# Patient Record
Sex: FEMALE | Race: WHITE | NOT HISPANIC OR LATINO | URBAN - METROPOLITAN AREA
[De-identification: names, ages, dates, MRNs, and addresses within clinical notes are randomized per-mention and may not be internally consistent; named-entity substitution may affect disease eponyms.]

---

## 2019-02-07 ENCOUNTER — HOSPITAL ENCOUNTER (OUTPATIENT)
Dept: OTHER | Facility: HOSPITAL | Age: 34
Setting detail: SPECIMEN
Discharge: HOME | End: 2019-02-07
Attending: SURGERY | Admitting: SURGERY

## 2023-05-22 ENCOUNTER — HOSPITAL ENCOUNTER (EMERGENCY)
Facility: HOSPITAL | Age: 38
Discharge: HOME | End: 2023-05-22
Attending: EMERGENCY MEDICINE | Admitting: EMERGENCY MEDICINE
Payer: MEDICAID

## 2023-05-22 VITALS
TEMPERATURE: 98.8 F | DIASTOLIC BLOOD PRESSURE: 100 MMHG | BODY MASS INDEX: 32.24 KG/M2 | HEIGHT: 68 IN | WEIGHT: 212.74 LBS | OXYGEN SATURATION: 100 % | SYSTOLIC BLOOD PRESSURE: 149 MMHG | HEART RATE: 93 BPM | RESPIRATION RATE: 16 BRPM

## 2023-05-22 DIAGNOSIS — S01.81XA FACIAL LACERATION, INITIAL ENCOUNTER: Primary | ICD-10-CM

## 2023-05-22 PROCEDURE — 0 LIDOCAINE 1 % SOLUTION

## 2023-05-22 PROCEDURE — 99282 EMERGENCY DEPT VISIT SF MDM: CPT

## 2023-05-22 RX ORDER — LIDOCAINE HYDROCHLORIDE 10 MG/ML
10 INJECTION, SOLUTION INFILTRATION; PERINEURAL ONCE
Status: COMPLETED | OUTPATIENT
Start: 2023-05-22 | End: 2023-05-22

## 2023-05-22 RX ORDER — DIAPER,BRIEF,INFANT-TODD,DISP
1 EACH MISCELLANEOUS ONCE
Status: COMPLETED | OUTPATIENT
Start: 2023-05-22 | End: 2023-05-22

## 2023-05-22 RX ADMIN — LIDOCAINE HYDROCHLORIDE 10 ML: 10 INJECTION, SOLUTION INFILTRATION; PERINEURAL at 21:42

## 2023-05-22 RX ADMIN — BACITRACIN 0.9 G: 500 OINTMENT TOPICAL at 22:44

## 2023-05-23 NOTE — ED PROVIDER NOTES
Subjective      Provider in Triage Note  Patient is a 38-year-old female presents to the ER today by private vehicle states that she was attempting to put a mirror up in her garage when it fell striking her in the head she did not lose consciousness she has no visual changes, no headache but is having pain at the site..  She believes she is up-to-date on her tetanus shot.  Bleeding controlled at triage.    History of Present Illness  Chief Complaint: Laceration      HPI: See PIT note    PCP: Gael        Review of Systems   Skin: Positive for wound.       History reviewed. No pertinent past medical history.    No Known Allergies    History reviewed. No pertinent surgical history.    History reviewed. No pertinent family history.    Social History     Socioeconomic History   • Marital status:            Objective   Physical Exam  HENT:      Head: Normocephalic. No raccoon eyes or Ye's sign.      Jaw: There is normal jaw occlusion.        Ears:      Comments: No bleeding or drainage from the ears nose     Nose: Nose normal.      Mouth/Throat:      Mouth: Mucous membranes are moist.      Pharynx: Oropharynx is clear.   Eyes:      Extraocular Movements: Extraocular movements intact.      Pupils: Pupils are equal, round, and reactive to light.   Cardiovascular:      Rate and Rhythm: Normal rate.      Pulses: Normal pulses.   Pulmonary:      Effort: Pulmonary effort is normal.   Musculoskeletal:         General: Normal range of motion.      Cervical back: Neck supple. No rigidity.   Skin:     General: Skin is warm and dry.   Neurological:      General: No focal deficit present.      Mental Status: She is alert and oriented to person, place, and time. Mental status is at baseline.         Laceration Repair    Date/Time: 5/22/2023 10:23 PM  Performed by: Isaura Aguilar APRN  Authorized by: Fabricio Treviño MD     Consent:     Consent obtained:  Verbal    Consent given by:  Patient    Risks discussed:   "Infection and poor cosmetic result    Alternatives discussed:  No treatment  Universal protocol:     Site/side marked: yes      Patient identity confirmed:  Verbally with patient, hospital-assigned identification number and arm band  Anesthesia:     Anesthesia method:  Local infiltration    Local anesthetic:  Lidocaine 1% w/o epi  Laceration details:     Location:  Face    Face location:  Forehead    Wound length (cm): approximately 2cm.  Pre-procedure details:     Preparation:  Patient was prepped and draped in usual sterile fashion  Exploration:     Hemostasis achieved with:  Direct pressure    Imaging outcome: foreign body not noted      Wound exploration: entire depth of wound visualized    Treatment:     Area cleansed with:  Shur-Clens and saline    Amount of cleaning:  Standard    Irrigation solution:  Sterile saline    Irrigation method:  Syringe    Debridement:  None    Undermining:  None  Skin repair:     Repair method:  Sutures    Suture size:  6-0    Suture material:  Nylon    Suture technique:  Simple interrupted    Number of sutures: 6.  Approximation:     Approximation:  Close  Repair type:     Repair type:  Simple  Post-procedure details:     Dressing:  Antibiotic ointment and adhesive bandage    Procedure completion:  Tolerated               ED Course  ED Course as of 05/22/23 2318   Mon May 22, 2023   2142 Lidocaine applied   [BH]      ED Course User Index  [BH] Isaura Aguilar, GAGANDEEP      /100   Pulse 93   Temp 98.8 °F (37.1 °C) (Temporal)   Resp 16   Ht 172.7 cm (68\")   Wt 96.5 kg (212 lb 11.9 oz)   SpO2 100%   BMI 32.35 kg/m²   Labs Reviewed - No data to display  Medications   lidocaine (XYLOCAINE) 1 % injection 10 mL (10 mL Injection Given 5/22/23 2142)   bacitracin ointment 0.9 g (0.9 g Topical Given 5/22/23 2244)     No radiology results for the last day                                       Medical Decision Making  This patient presented to the emergency room following being " struck in the head by a mirror she had no loss of consciousness she has no residual headache, no nausea or vomiting no double vision or blurred vision.  She did sustain a laceration to the left side of her forehead, bleeding controlled.  She believes she is up-to-date on her tetanus shot.  She states that the mirror did not break and has no concerns for embedded foreign body.  Patient was draped in sterile fashion, lidocaine used for anesthetic and area was cleaned with Shur-Clens and normal saline.  6 sutures applied, patient tolerated we discussed appropriate wound care and when to return to the emergency room as well as suture removal.  Bacitracin and dressing applied.  Patient gave verbal understanding of plan of care, she was alert oriented nontoxic in appearance time discharge, denied further questions or complaints.    Chart review: 4/27/2023 outpatient follow-up with Dr. Mayo      This document is intended for medical expert use only. Reading of this document by patients and/or patient's family without participating medical staff guidance may result in misinterpretation and unintended morbidity. Any interpretation of such data is the responsibility of the patient and/or family member responsible for the patient in concert with their primary or specialist providers, not to be left for sources of online searches such as [employer], [employer] or similar queries. Relying on these approaches to knowledge may result in misinterpretation, misguided goals of care and even death should patients or family members try recommendations outside of the realm of professional medical care in a supervised inpatient environment.     Note: Please forgive punctuation, typographic/voice recognition errors, as portions of this document were created with Dragon Dictation, a voice recognition software.    Appropriate PPE worn during exam.    Facial laceration, initial encounter: complicated acute illness or injury  Amount and/or  Complexity of Data Reviewed  External Data Reviewed: notes.      Risk  OTC drugs.  Prescription drug management.  Minor surgery with no identified risk factors.          Final diagnoses:   Facial laceration, initial encounter       ED Disposition  ED Disposition     ED Disposition   Discharge    Condition   Stable    Comment   --             PATIENT CONNECTION - [facility] [id number]  [phone]  Schedule an appointment as soon as possible for a visit in 1 week  As needed, If symptoms worsen         Medication List      No changes were made to your prescriptions during this visit.          Isaura Aguilar, APRN  05/22/23 3278

## 2023-05-23 NOTE — DISCHARGE INSTRUCTIONS
Keep area clean and dry can apply bacitracin or Neosporin.  Soap and water, daily 2-3 times a day  Ice to the area of discomfort 20 minutes at a time several times a day    Sutures can be removed in 7 to 10 days    Follow-up with primary care    Return to the ER for new or worsening symptoms

## 2023-05-23 NOTE — ED NOTES
Patient reports a mirror in her garage fell on her and the corner cut her forehead around 1730 tonight. Patient reports headache as well. Laceration noted above left eyebrow that looks like an 'x'. Abrasion noted to left eyelid as well as some bruising. Bleeding controlled when patient arrived to ER by compression and towel patient had from home.